# Patient Record
Sex: FEMALE | Race: WHITE | NOT HISPANIC OR LATINO | Employment: FULL TIME | URBAN - METROPOLITAN AREA
[De-identification: names, ages, dates, MRNs, and addresses within clinical notes are randomized per-mention and may not be internally consistent; named-entity substitution may affect disease eponyms.]

---

## 2018-06-12 ENCOUNTER — TELEPHONE (OUTPATIENT)
Dept: OBSTETRICS AND GYNECOLOGY | Age: 28
End: 2018-06-12

## 2018-06-12 NOTE — TELEPHONE ENCOUNTER
New Patient  Ins[facility]- [facility].    Pt had a still born about 8 months ago and had a miscarriage since then. Pt is wanting to come in and discuss pregnancy. Pt is currently trying for pregnancy.

## 2018-06-12 NOTE — TELEPHONE ENCOUNTER
Schedule is very crowded due to my vacation I recommend the patient set up an appointment with maternal-fetal medicine for prepregnancy counseling.  She could see Dr. Chung at [place].

## 2018-06-12 NOTE — TELEPHONE ENCOUNTER
Tevin Escalante MD   to Me        6/12/18 1:22 PM   Note      Schedule is very crowded due to my vacation I recommend the patient set up an appointment with maternal-fetal medicine for prepregnancy counseling.  She could see Dr. Chung at [place].